# Patient Record
(demographics unavailable — no encounter records)

---

## 2025-03-22 NOTE — LETTER BODY
[FreeTextEntry1] : Nehal Franks -[address] [place] [address], [address] [phone]  Dear Dr. Franks,  Reason for Visit: BPH.  This is a 78 year-old gentleman with symptoms of BPH. Patient is here today for follow-up. Since he was last seen, patient reports taking Flomax QD. Patient reports taking the medications regularly without any side effects or difficulties with the medication. He reports progressive symptoms despite medical therapy. He denies any hematuria or urinary incontinence. His symptoms are aggravated by hydration. He denies any alleviating factors. He reports no pain. All other review of systems are negative. He has no cancer in his family medical history. He has no previous surgical history. Past medical history, family history and social history were inquired and were noncontributory to current condition. The patient does not use tobacco or drink alcohol. Medications and allergies were reviewed. He has no known allergies to medication.  On examination, the patient is a healthy-appearing gentleman in no acute distress. He is alert and oriented follows commands. He has normal mood and affect. He is normocephalic. Neck is supple. Oral no thrush Respirations are unlabored. His abdomen is soft and nontender. Bladder is nonpalpable. No CVA tenderness. Neurologically he is grossly intact. No peripheral edema. Skin without gross abnormality.   His BMP demonstrated normal renal function, creatinine 0.87. His PSA was 1.59, WNL.   Post-void residual on bladder scan today was 0 cc.   ASSESSMENT: BPH.   I counseled the patient. Patient reports progressive urinary symptoms on Flomax. His PVR today was 0 cc. Patient has not met treatment goals. I recommended patient increases Flomax to BID. I discussed the potential side effects of the medication. I counseled the patient on its use and side effects. If the patient develops any side effects, the patient will discontinue medication and contact me. I renewed the patient's prescription for Flomax BID today. I encouraged the patient to continue medications regularly as directed. His PSA was stable. Risks and alternatives were discussed. I answered the patient questions. The patient will follow-up as directed and will contact me with any questions or concerns. Thank you for the opportunity to participate in the care of Mr. FLOREZ. I will keep you updated on his progress.  Patient will continue longitudinal care for his complex and serious chronic condition.   Plan: Increase Flomax to BID. PVR. Follow up in 3 months.

## 2025-03-22 NOTE — ADDENDUM
[FreeTextEntry1] : Entered by Zac Jones, acting as scribe for Dr. Lew Shaw. The documentation recorded by the scribe accurately reflects the service I personally performed and the decisions made by me.

## 2025-03-22 NOTE — LETTER BODY
[FreeTextEntry1] : Nehal Franks -[address] [place] [address], [address] [phone]  Dear Dr. Franks,  Reason for Visit: BPH.  This is a 78 year-old gentleman with symptoms of BPH. Patient is here today for follow-up. Since he was last seen, patient reports taking Flomax QD. Patient reports taking the medications regularly without any side effects or difficulties with the medication. He reports progressive symptoms despite medical therapy. He denies any hematuria or urinary incontinence. His symptoms are aggravated by hydration. He denies any alleviating factors. He reports no pain. All other review of systems are negative. He has no cancer in his family medical history. He has no previous surgical history. Past medical history, family history and social history were inquired and were noncontributory to current condition. The patient does not use tobacco or drink alcohol. Medications and allergies were reviewed. He has no known allergies to medication.  On examination, the patient is a healthy-appearing gentleman in no acute distress. He is alert and oriented follows commands. He has normal mood and affect. He is normocephalic. Neck is supple. Oral no thrush Respirations are unlabored. His abdomen is soft and nontender. Bladder is nonpalpable. No CVA tenderness. Neurologically he is grossly intact. No peripheral edema. Skin without gross abnormality.   His BMP demonstrated normal renal function, creatinine 0.87. His PSA was 1.59, WNL.   Post-void residual on bladder scan today was 0 cc.   ASSESSMENT: BPH.   I counseled the patient. Patient reports progressive urinary symptoms on Flomax. His PVR today was 0 cc. Patient has not met treatment goals. I recommended patient increases Flomax to BID. I discussed the potential side effects of the medication. I counseled the patient on its use and side effects. If the patient develops any side effects, the patient will discontinue medication and contact me. I renewed the patient's prescription for Flomax BID today. I encouraged the patient to continue medications regularly as directed. His PSA was stable. Risks and alternatives were discussed. I answered the patient questions. The patient will follow-up as directed and will contact me with any questions or concerns. Thank you for the opportunity to participate in the care of Mr. FLOREZ. I will keep you updated on his progress.  Patient will continue longitudinal care for his complex and serious chronic condition.   Plan: Increase Flomax to BID. PVR. Follow up in 3 months.

## 2025-03-22 NOTE — HISTORY OF PRESENT ILLNESS
[FreeTextEntry1] : Follow-up for BPH, on Flomax QD, report weak urine stream, nocturia for 4-5 times, urgency and frequency, PVR 0ml today PSA 1.59 in Dec 2023  Please refer to URO Consult Note.

## 2025-06-20 NOTE — LETTER BODY
[FreeTextEntry1] : Nehal Franks -[address] [place] [address], [address] [phone]  Dear Dr. Franks,  Reason for Visit: BPH.  This is a 79 year-old gentleman with symptoms of BPH. Patient is here today for follow-up. Since he was last seen, patient reports increasing his Flomax to BID. He reports slightly improved urinary symptoms but he still experiences 4-5 episodes of nocturia per night. He also notes urinary urgency. He denies any hematuria or urinary incontinence. His symptoms are aggravated by hydration. He denies any alleviating factors. He reports no pain. All other review of systems are negative. He has no cancer in his family medical history. He has no previous surgical history. Past medical history, family history and social history were inquired and were noncontributory to current condition. The patient does not use tobacco or drink alcohol. Medications and allergies were reviewed. He has no known allergies to medication.  On examination, the patient is a healthy-appearing gentleman in no acute distress. He is alert and oriented follows commands. He has normal mood and affect. He is normocephalic. Neck is supple. Oral no thrush Respirations are unlabored. His abdomen is soft and nontender. Bladder is nonpalpable. No CVA tenderness. Neurologically he is grossly intact. No peripheral edema. Skin without gross abnormality.  His BMP in 2023 demonstrated normal renal function, creatinine 0.87. His PSA in 2023 was 1.59, WNL.   Post-void residual on bladder scan today was 20 cc.   ASSESSMENT: BPH.   I counseled the patient. Patient reports a slight improvement after increasing his Flomax to BID. However he reports 4-5 episodes of nocturia per night and urinary urgency. His PVR today was 20 cc. I recommended that he begin a trial of Oxybutynin. I discussed the potential side effects of the medication. I counseled the patient on its use and side effects. If the patient develops any side effects, the patient will discontinue the medication and contact me. I recommended that he continue taking Flomax BID as directed. He will also obtain BMP and PSA to ensure stability. Risks and alternatives were discussed. I answered the patient questions. The patient will follow-up as directed and will contact me with any questions or concerns. Thank you for the opportunity to participate in the care of Mr. FLOREZ. I will keep you updated on his progress.  Patient will continue longitudinal care for his complex and serious chronic condition.  Plan: Continue Flomax BID. Begin oxybutynin. PSA. BMP. Follow up in 3 months.

## 2025-06-20 NOTE — HISTORY OF PRESENT ILLNESS
[FreeTextEntry1] : F/U for BPH, increased Flomax to BID, reports slightly improved symptom, but still nocturia 4-5 times, and new urgency. PVR 20ml today PSA 1.59 in 2023  Please refer to URO Consult note

## 2025-06-20 NOTE — LETTER BODY
[FreeTextEntry1] : Nehal Franks -[address] [place] [address], [address] [phone]  Dear Dr. Franks,  Reason for Visit: BPH.  This is a 79 year-old gentleman with symptoms of BPH. Patient is here today for follow-up. Since he was last seen, patient reports increasing his Flomax to BID. He reports slightly improved urinary symptoms but he still experiences 4-5 episodes of nocturia per night. He also notes urinary urgency. He denies any hematuria or urinary incontinence. His symptoms are aggravated by hydration. He denies any alleviating factors. He reports no pain. All other review of systems are negative. He has no cancer in his family medical history. He has no previous surgical history. Past medical history, family history and social history were inquired and were noncontributory to current condition. The patient does not use tobacco or drink alcohol. Medications and allergies were reviewed. He has no known allergies to medication.  On examination, the patient is a healthy-appearing gentleman in no acute distress. He is alert and oriented follows commands. He has normal mood and affect. He is normocephalic. Neck is supple. Oral no thrush Respirations are unlabored. His abdomen is soft and nontender. Bladder is nonpalpable. No CVA tenderness. Neurologically he is grossly intact. No peripheral edema. Skin without gross abnormality.  His BMP in 2023 demonstrated normal renal function, creatinine 0.87. His PSA in 2023 was 1.59, WNL.   Post-void residual on bladder scan today was 20 cc.   ASSESSMENT: BPH.   I counseled the patient. Patient reports a slight improvement after increasing his Flomax to BID. However he reports 4-5 episodes of nocturia per night and urinary urgency. His PVR today was 20 cc. I recommended that he begin a trial of Oxybutynin. I discussed the potential side effects of the medication. I counseled the patient on its use and side effects. If the patient develops any side effects, the patient will discontinue the medication and contact me. I recommended that he continue taking Flomax BID as directed. He will also obtain BMP and PSA to ensure stability. Risks and alternatives were discussed. I answered the patient questions. The patient will follow-up as directed and will contact me with any questions or concerns. Thank you for the opportunity to participate in the care of Mr. FLOREZ. I will keep you updated on his progress.  Patient will continue longitudinal care for his complex and serious chronic condition.  Plan: Continue Flomax BID. Begin oxybutynin. PSA. BMP. Follow up in 3 months.

## 2025-06-20 NOTE — ADDENDUM
[FreeTextEntry1] : Entered by Mary Carrillo, acting as scribe for Dr Lew Shaw. The documentation recorded by the scribe accurately reflects the service I personally performed and the decisions made by me.